# Patient Record
Sex: FEMALE | Race: ASIAN
[De-identification: names, ages, dates, MRNs, and addresses within clinical notes are randomized per-mention and may not be internally consistent; named-entity substitution may affect disease eponyms.]

---

## 2017-04-24 ENCOUNTER — HOSPITAL ENCOUNTER (OUTPATIENT)
Age: 72
Discharge: HOME | End: 2017-04-24
Payer: COMMERCIAL

## 2017-04-24 DIAGNOSIS — Z12.31: Primary | ICD-10-CM

## 2017-04-24 DIAGNOSIS — M85.88: Primary | ICD-10-CM

## 2018-06-19 ENCOUNTER — HOSPITAL ENCOUNTER (OUTPATIENT)
Dept: HOSPITAL 76 - DI | Age: 73
Discharge: HOME | End: 2018-06-19
Attending: INTERNAL MEDICINE
Payer: COMMERCIAL

## 2018-06-19 DIAGNOSIS — D25.9: Primary | ICD-10-CM

## 2018-06-19 PROCEDURE — 76856 US EXAM PELVIC COMPLETE: CPT

## 2018-06-19 PROCEDURE — 76830 TRANSVAGINAL US NON-OB: CPT

## 2018-06-20 NOTE — ULTRASOUND REPORT
Procedure Date:  06/19/2018   

Accession Number:  [id number] / [id number]                    

Procedure:  US  - Pelvic w/Transvaginal CPT Code:  

 

FULL RESULT:

 

 

EXAM: Pelvic w/Transvaginal

 

DATE: 6/19/2018 4:42 PM

 

CLINICAL HISTORY: PELVIC PAIN

 

COMPARISON: 12/29/2010 CT

 

TECHNIQUE: Real time scanning by the sonographer with saved static images 

reviewed. Transabdominal approach for global evaluation. Transvaginal 

scanning for detailed assessment of the uterus and ovaries.

 

FINDINGS:

 

Uterus:  6.6 x 3.3 x 4.8 cm, volume 54.6 cc. Retroverted position. Normal 

overall size and echotexture.

 

Masses: Fundal exophytic fibroid 2.2 x 2 x 3.5 cm similar to prior CT.

 

Endometrium: 2 mm mm. Small amount of fluid in the endometrial canal..

 

Cervix: Unremarkable.

 

Right Ovary/Adnexa: Ovary not definitely identified. No adnexal masses.

 

Left Ovary/Adnexa: 2.3 x 1.2 x 1.9 cm, volume 2.7 cc. Normal echotexture. 

Blood flow is present. No adnexal mass is seen.

 

Free Fluid: None.

 

Other: None.

 

IMPRESSION:

Exophytic fundal uterine fibroid. Small amount of fluid within the 

endometrial canal. Otherwise negative. RADIA

## 2019-07-17 ENCOUNTER — HOSPITAL ENCOUNTER (OUTPATIENT)
Dept: HOSPITAL 76 - DI | Age: 74
Discharge: HOME | End: 2019-07-17
Attending: INTERNAL MEDICINE
Payer: COMMERCIAL

## 2019-07-17 DIAGNOSIS — R07.89: Primary | ICD-10-CM

## 2019-07-17 DIAGNOSIS — R10.9: ICD-10-CM

## 2019-07-17 PROCEDURE — 74249: CPT

## 2019-07-17 PROCEDURE — 71046 X-RAY EXAM CHEST 2 VIEWS: CPT

## 2019-07-17 NOTE — XRAY REPORT
Reason:  UNSPECIFIED ABDOMINAL PAIN,OTHER CHEST PAIN

Procedure Date:  07/17/2019   

Accession Number:  [id number] / [id number]                    

Procedure:  FL  - UGI SBFT W/Air CPT Code:  

 

FULL RESULT:

 

 

EXAM:

UPPER GI WITH SMALL BOWEL FOLLOW-THROUGH

 

EXAM DATE: 7/17/2019 02:24 PM.

 

CLINICAL HISTORY: Unspecified abdominal pain, other chest pain.

 

COMPARISONS: UGI SBFT W/AIR 07/17/2019 11:16 AM.

 

TECHNIQUE: Routine single contrast upper gastrointestinal small bowel 

follow-through technique.

Fluoroscopy Time: 30 seconds.

Number of fluoroscopy images: 20.

 

FINDINGS:

 

Esophageal Motility: Normal peristaltic stripping wave.

 

Gastroesophageal Junction: Normal. No hernia or significant reflux 

demonstrated with or without Valsalva maneuvers.

 

Stomach: Within normal limits on single contrast technique.

 

Duodenum: Normal duodenal mucosal pattern. No ulcers or diverticula 

identified.

 

Jejunum: Normal mucosal pattern. No masses or obstruction.

 

Ileum: Normal mucosal pattern. No masses or obstruction. The ileocecal 

valve is patent with passage of contrast into the cecum.

 

Other: None.

IMPRESSION: Normal upper GI with small bowel follow-through.

 

RADIA

## 2019-07-17 NOTE — XRAY REPORT
Reason:  UNSPECIFIED ABDOMINAL PAIN, OTHER CHEST PAIN

Procedure Date:  07/17/2019   

Accession Number:  [id number] / [id number]                    

Procedure:  XR  - Chest 2 View X-Ray CPT Code:  48354

 

FULL RESULT:

 

 

EXAM:

CHEST RADIOGRAPHY

 

EXAM DATE: 7/17/2019 11:28 AM.

 

CLINICAL HISTORY: Unspecified abdominal pain, other chest pain.

 

COMPARISON: None.

 

TECHNIQUE: 2 views.

 

FINDINGS:

Lungs/Pleura: No focal opacities evident. No pleural effusion. No 

pneumothorax. Normal volumes.

 

Mediastinum: Heart and mediastinal contours are within normal limits for 

size, mild calcifications of the aortic arch.

 

Other: Cholecystectomy clips are noted.

IMPRESSION: No acute cardiopulmonary abnormality.

 

RADIA

## 2019-09-14 ENCOUNTER — HOSPITAL ENCOUNTER (EMERGENCY)
Dept: HOSPITAL 76 - ED | Age: 74
LOS: 1 days | Discharge: HOME | End: 2019-09-15
Payer: COMMERCIAL

## 2019-09-14 VITALS — DIASTOLIC BLOOD PRESSURE: 71 MMHG | SYSTOLIC BLOOD PRESSURE: 159 MMHG

## 2019-09-14 DIAGNOSIS — F45.8: ICD-10-CM

## 2019-09-14 DIAGNOSIS — F43.22: Primary | ICD-10-CM

## 2019-09-14 LAB
ALBUMIN DIAFP-MCNC: 4.2 G/DL (ref 3.2–5.5)
ALBUMIN/GLOB SERPL: 1.1 {RATIO} (ref 1–2.2)
ALP SERPL-CCNC: 73 IU/L (ref 42–121)
ALT SERPL W P-5'-P-CCNC: 18 IU/L (ref 10–60)
ANION GAP SERPL CALCULATED.4IONS-SCNC: 14 MMOL/L (ref 6–13)
AST SERPL W P-5'-P-CCNC: 21 IU/L (ref 10–42)
BASOPHILS NFR BLD AUTO: 0.1 10^3/UL (ref 0–0.1)
BASOPHILS NFR BLD AUTO: 0.6 %
BILIRUB BLD-MCNC: 0.5 MG/DL (ref 0.2–1)
BUN SERPL-MCNC: 25 MG/DL (ref 6–20)
CALCIUM UR-MCNC: 9.4 MG/DL (ref 8.5–10.3)
CHLORIDE SERPL-SCNC: 103 MMOL/L (ref 101–111)
CLARITY UR REFRACT.AUTO: CLEAR
CO2 SERPL-SCNC: 23 MMOL/L (ref 21–32)
CREAT SERPLBLD-SCNC: 0.8 MG/DL (ref 0.4–1)
EOSINOPHIL # BLD AUTO: 0.2 10^3/UL (ref 0–0.7)
EOSINOPHIL NFR BLD AUTO: 1.6 %
ERYTHROCYTE [DISTWIDTH] IN BLOOD BY AUTOMATED COUNT: 11.9 % (ref 12–15)
GFRSERPLBLD MDRD-ARVRAT: 71 ML/MIN/{1.73_M2} (ref 89–?)
GLOBULIN SER-MCNC: 4 G/DL (ref 2.1–4.2)
GLUCOSE SERPL-MCNC: 155 MG/DL (ref 70–100)
GLUCOSE UR QL STRIP.AUTO: NEGATIVE MG/DL
HGB UR QL STRIP: 13.8 G/DL (ref 12–16)
KETONES UR QL STRIP.AUTO: NEGATIVE MG/DL
LIPASE SERPL-CCNC: 45 U/L (ref 22–51)
LYMPHOCYTES # SPEC AUTO: 4.6 10^3/UL (ref 1.5–3.5)
LYMPHOCYTES NFR BLD AUTO: 48.8 %
MCH RBC QN AUTO: 30.7 PG (ref 27–31)
MCHC RBC AUTO-ENTMCNC: 33.7 G/DL (ref 32–36)
MCV RBC AUTO: 91.1 FL (ref 81–99)
MONOCYTES # BLD AUTO: 0.7 10^3/UL (ref 0–1)
MONOCYTES NFR BLD AUTO: 7.6 %
NEUTROPHILS # BLD AUTO: 3.8 10^3/UL (ref 1.5–6.6)
NEUTROPHILS # SNV AUTO: 9.3 X10^3/UL (ref 4.8–10.8)
NEUTROPHILS NFR BLD AUTO: 41.2 %
NITRITE UR QL STRIP.AUTO: NEGATIVE
PDW BLD AUTO: 9.6 FL (ref 7.9–10.8)
PH UR STRIP.AUTO: 7 PH (ref 5–7.5)
PLATELET # BLD: 349 10^3/UL (ref 130–450)
PROT SPEC-MCNC: 8.2 G/DL (ref 6.7–8.2)
PROT UR STRIP.AUTO-MCNC: NEGATIVE MG/DL
RBC # UR STRIP.AUTO: (no result) /UL
RBC # URNS HPF: (no result) /HPF (ref 0–5)
RBC MAR: 4.49 10^6/UL (ref 4.2–5.4)
SODIUM SERPLBLD-SCNC: 140 MMOL/L (ref 135–145)
SP GR UR STRIP.AUTO: 1.01 (ref 1–1.03)
SQUAMOUS URNS QL MICRO: (no result)
UROBILINOGEN UR QL STRIP.AUTO: (no result) E.U./DL
UROBILINOGEN UR STRIP.AUTO-MCNC: NEGATIVE MG/DL

## 2019-09-14 PROCEDURE — 83690 ASSAY OF LIPASE: CPT

## 2019-09-14 PROCEDURE — 93005 ELECTROCARDIOGRAM TRACING: CPT

## 2019-09-14 PROCEDURE — 99284 EMERGENCY DEPT VISIT MOD MDM: CPT

## 2019-09-14 PROCEDURE — 96374 THER/PROPH/DIAG INJ IV PUSH: CPT

## 2019-09-14 PROCEDURE — 36415 COLL VENOUS BLD VENIPUNCTURE: CPT

## 2019-09-14 PROCEDURE — 84484 ASSAY OF TROPONIN QUANT: CPT

## 2019-09-14 PROCEDURE — 81001 URINALYSIS AUTO W/SCOPE: CPT

## 2019-09-14 PROCEDURE — 81003 URINALYSIS AUTO W/O SCOPE: CPT

## 2019-09-14 PROCEDURE — 87086 URINE CULTURE/COLONY COUNT: CPT

## 2019-09-14 PROCEDURE — 85025 COMPLETE CBC W/AUTO DIFF WBC: CPT

## 2019-09-14 PROCEDURE — 80053 COMPREHEN METABOLIC PANEL: CPT

## 2019-09-14 NOTE — ED PHYSICIAN DOCUMENTATION
PD HPI DYSPNEA





- Stated complaint


Stated Complaint: SOA





- Chief complaint


Chief Complaint: Resp





- History obtained from


History obtained from: Patient





- History of Present Illness


Timing - onset: Today


Timing - onset during: Rest


Timing - duration: Hours


Timing - details: Gradual onset, Still present


Inciting event(s): Emotional event


Improved by: Rest


Worsened by: Other (thinking of friend that just .)


Associated symptoms: No: Fever, Cough, Hemoptysis, Wheezing


Similar symptoms before: Diagnosis (anxiety)


Recently seen: Clinic





- Additional information


Additional information: 





73 y/o female with a history of grief reaction causing panic attack has had a 

friend die and she is emotionally upset. She has hyperventilation syndrome and 

has had medication to help this previously. She recalls a number of times this 

has happened related to deaths. She states that she is not currently otherwise 

ill. 





Review of Systems


Constitutional: denies: Fever


Eyes: denies: Decreased vision


Ears: denies: Ear pain


Nose: denies: Rhinorrhea / runny nose, Congestion


Throat: denies: Sore throat


Cardiac: denies: Chest pain / pressure, Palpitations


Respiratory: reports: Dyspnea.  denies: Cough, Wheezing


GI: denies: Abdominal Pain, Nausea, Vomiting


: denies: Dysuria, Frequency


Musculoskeletal: denies: Neck pain


Neurologic: denies: Generalized weakness, Focal weakness, Numbness


Psychiatric: reports: Depressed, Anxiety





PD PAST MEDICAL HISTORY





- Past Medical History


Cardiovascular: None


Respiratory: None


Endocrine/Autoimmune: None


GI: Hiatal hernia


Musculoskeletal: Osteoporosis





- Past Surgical History


Past Surgical History: Yes


General: Cholecystectomy


/GYN:  section





- Present Medications


Home Medications: 


                                Ambulatory Orders











 Medication  Instructions  Recorded  Confirmed


 


Hydrocodone/Acetaminophen [Norco 1 each PO Q6H PRN #20 tablet 16 





5-325 Tablet]   


 


Lorazepam [Ativan] 1 mg PO Q6HR PRN #12 tablet 19 














- Allergies


Allergies/Adverse Reactions: 


                                    Allergies











Allergy/AdvReac Type Severity Reaction Status Date / Time


 


Penicillins Allergy  Unknown Verified 19 22:24














- Social History


Does the pt smoke?: No


Smoking Status: Never smoker


Does the pt drink ETOH?: No


Does the pt have substance abuse?: No





- Immunizations


Immunizations are current?: Yes





PD ED PE NORMAL





- Vitals


Vital signs reviewed: Yes (hypertensive and tachypneic)





- General


General: Alert and oriented X 3, Well developed/nourished, Other (73 y/o female 

anxious and crying )





- HEENT


HEENT: Atraumatic, PERRL, EOMI





- Neck


Neck: Supple, no meningeal sign, No bony TTP





- Cardiac


Cardiac: RRR, No murmur





- Respiratory


Respiratory: No respiratory distress, Clear bilaterally





- Abdomen


Abdomen: Soft, Non tender





- Back


Back: No CVA TTP, No spinal TTP





- Derm


Derm: Normal color, Warm and dry, No rash





- Extremities


Extremities: No deformity, No tenderness to palpate, Normal ROM s pain, No 

edema, No calf tenderness / cord





- Neuro


Neuro: Alert and oriented X 3, CNs 2-12 intact, No motor deficit, No sensory 

deficit, Normal speech


Eye Opening: Spontaneous


Motor: Obeys Commands


Verbal: Oriented


GCS Score: 15





- Psych


Psych: Other (mood is anxious and the affect is labile from flat to crying )





Results





- Vitals


Vitals: 


                               Vital Signs - 24 hr











  19





  22:15 23:36 23:53


 


Temperature 37.1 C  


 


Heart Rate 77 72 73


 


Respiratory 26 H 20 21





Rate   


 


Blood Pressure 208/89 H  159/71 H


 


O2 Saturation 100 96 96








                                     Oxygen











O2 Source                      Room air

















- EKG (time done)


  ** 2222


Rate: Rate (enter#) (67)


Rhythm: NSR


Ischemia: Normal ST segments


Compare to prior EKG: Old EKG unavailable


Computer interpretation: Agree with computer





- Labs


Labs: 


                                Laboratory Tests











  19





  22:18 22:25 22:25


 


WBC   9.3 


 


RBC   4.49 


 


Hgb   13.8 


 


Hct   40.9 


 


MCV   91.1 


 


MCH   30.7 


 


MCHC   33.7 


 


RDW   11.9 L 


 


Plt Count   349 


 


MPV   9.6 


 


Neut # (Auto)   3.8 


 


Lymph # (Auto)   4.6 H 


 


Mono # (Auto)   0.7 


 


Eos # (Auto)   0.2 


 


Baso # (Auto)   0.1 


 


Absolute Nucleated RBC   0.00 


 


Nucleated RBC %   0.0 


 


Sodium    140


 


Potassium    3.2 L


 


Chloride    103


 


Carbon Dioxide    23


 


Anion Gap    14.0 H


 


BUN    25 H


 


Creatinine    0.8


 


Estimated GFR (MDRD)    71 L


 


Glucose    155 H


 


Calcium    9.4


 


Total Bilirubin    0.5


 


AST    21


 


ALT    18


 


Alkaline Phosphatase    73


 


Troponin I High Sens   


 


Total Protein    8.2


 


Albumin    4.2


 


Globulin    4.0


 


Albumin/Globulin Ratio    1.1


 


Lipase    45


 


Urine Color  COLORLESS  


 


Urine Clarity  CLEAR  


 


Urine pH  7.0  


 


Ur Specific Gravity  1.010  


 


Urine Protein  NEGATIVE  


 


Urine Glucose (UA)  NEGATIVE  


 


Urine Ketones  NEGATIVE  


 


Urine Occult Blood  SMALL H  


 


Urine Nitrite  NEGATIVE  


 


Urine Bilirubin  NEGATIVE  


 


Urine Urobilinogen  0.2 (NORMAL)  


 


Ur Leukocyte Esterase  NEGATIVE  


 


Urine RBC  0-5  


 


Urine WBC  0-3  


 


Ur Squamous Epith Cells  RARE Squamous  


 


Urine Bacteria  None Seen  


 


Ur Microscopic Review  INDICATED  


 


Urine Culture Comments  NOT INDICATED  














  19





  22:25


 


WBC 


 


RBC 


 


Hgb 


 


Hct 


 


MCV 


 


MCH 


 


MCHC 


 


RDW 


 


Plt Count 


 


MPV 


 


Neut # (Auto) 


 


Lymph # (Auto) 


 


Mono # (Auto) 


 


Eos # (Auto) 


 


Baso # (Auto) 


 


Absolute Nucleated RBC 


 


Nucleated RBC % 


 


Sodium 


 


Potassium 


 


Chloride 


 


Carbon Dioxide 


 


Anion Gap 


 


BUN 


 


Creatinine 


 


Estimated GFR (MDRD) 


 


Glucose 


 


Calcium 


 


Total Bilirubin 


 


AST 


 


ALT 


 


Alkaline Phosphatase 


 


Troponin I High Sens  3.3


 


Total Protein 


 


Albumin 


 


Globulin 


 


Albumin/Globulin Ratio 


 


Lipase 


 


Urine Color 


 


Urine Clarity 


 


Urine pH 


 


Ur Specific Gravity 


 


Urine Protein 


 


Urine Glucose (UA) 


 


Urine Ketones 


 


Urine Occult Blood 


 


Urine Nitrite 


 


Urine Bilirubin 


 


Urine Urobilinogen 


 


Ur Leukocyte Esterase 


 


Urine RBC 


 


Urine WBC 


 


Ur Squamous Epith Cells 


 


Urine Bacteria 


 


Ur Microscopic Review 


 


Urine Culture Comments 














PD MEDICAL DECISION MAKING





- ED course


Complexity details: reviewed old records, reviewed results, re-evaluated 

patient, considered differential, d/w patient, d/w family


ED course: 





73 y/o female with a history of grief reaction causing hyperventilation has 

hyperventilation with grief reaction and she is administered IV ativan with 

marked improvement and thankfulness. 





Departure





- Departure


Disposition: 01 Home, Self Care


Clinical Impression: 


 Grief reaction





Condition: Stable


Instructions:  ED Grief Reaction


Follow-Up: 


Amirah Bruno MD [Primary Care Provider] - 


Prescriptions: 


Lorazepam [Ativan] 1 mg PO Q6HR PRN #12 tablet


 PRN Reason: Anxiety


Discharge Date/Time: 09/15/19 00:05

## 2019-09-26 ENCOUNTER — HOSPITAL ENCOUNTER (EMERGENCY)
Dept: HOSPITAL 76 - ED | Age: 74
LOS: 1 days | Discharge: HOME | End: 2019-09-27
Payer: COMMERCIAL

## 2019-09-26 DIAGNOSIS — F41.9: Primary | ICD-10-CM

## 2019-09-26 PROCEDURE — 99283 EMERGENCY DEPT VISIT LOW MDM: CPT

## 2019-09-26 NOTE — ED PHYSICIAN DOCUMENTATION
History of Present Illness





- Stated complaint


Stated Complaint: EARS BURNING/MEDICATION CONCERN





- Chief complaint


Chief Complaint: General





- Additonal information


Additional information: 





This is a 74-year-old female who presents with anxiety.  Patient states that she

developed anxiety attacks in the past after a friend of hers , these been 

well controlled until recently when she developed panic attack.  She was seen 

here in the emergency department and was prescribed some Ativan.  She took this 

a few times and it seemed to help.  She recently followed up with her primary 

care provider who told her to stop taking Ativan given the possible side effects

it has for someone her age, and started her on buspirone instead.  She is 

feeling well over the last several days but then tonight she began developing a 

feeling of anxiety and worry, and she felt that her ears were burning and she 

felt quite panicked.  She took a buspirone tablet but this did not relieve her 

symptoms so she presented here for further treatment of her anxiety.  She denies

any chest pain or shortness of breath, No fever or abdominal pain.





Review of Systems


Constitutional: denies: Fever


Cardiac: denies: Chest pain / pressure


GI: denies: Abdominal Pain


Psychiatric: reports: Anxiety





PD PAST MEDICAL HISTORY





- Past Medical History


Past Medical History: Yes


Cardiovascular: None


Respiratory: None


Neuro: None


Endocrine/Autoimmune: None


GI: Hiatal hernia


GYN: None


: None


HEENT: None


Psych: Anxiety


Musculoskeletal: Osteoporosis


Derm: None





- Past Surgical History


Past Surgical History: Yes


General: Cholecystectomy


/GYN:  section





- Present Medications


Home Medications: 


                                Ambulatory Orders











 Medication  Instructions  Recorded  Confirmed


 


Hydrocodone/Acetaminophen [Norco 1 each PO Q6H PRN #20 tablet 16 





5-325 Tablet]   


 


Lorazepam [Ativan] 1 mg PO Q6HR PRN #12 tablet 19 


 


hydrOXYzine HCl [Hydroxyzine HCl] 10 mg PO DAILY PRN #5 tablet 19 














- Allergies


Allergies/Adverse Reactions: 


                                    Allergies











Allergy/AdvReac Type Severity Reaction Status Date / Time


 


Penicillins Allergy  Unknown Verified 19 23:12














- Social History


Does the pt smoke?: No


Smoking Status: Never smoker


Does the pt drink ETOH?: No


Does the pt have substance abuse?: No





- Immunizations


Immunizations are current?: Yes





- POLST


Patient has POLST: No





PD ED PE NORMAL





- Vitals


Vital signs reviewed: Yes





- General


General: Alert and oriented X 3, No acute distress





- HEENT


HEENT: PERRL





- Neck


Neck: Supple, no meningeal sign





- Cardiac


Cardiac: RRR





- Respiratory


Respiratory: Clear bilaterally





- Abdomen


Abdomen: Normal bowel sounds, Soft, Non tender, Non distended





- Derm


Derm: Warm and dry





- Extremities


Extremities: No deformity





- Neuro


Neuro: Alert and oriented X 3





- Psych


Psych: Normal mood, Normal affect





Results





- Vitals


Vitals: 


                                     Oxygen











O2 Source                      Room air

















PD MEDICAL DECISION MAKING





- ED course


Complexity details: considered differential (Anxiety, medication side effect)


ED course: 


On initial evaluation patient appears well, she is somewhat anxious.  Her vital 

signs are unremarkable, her exam is also unremarkable.  She is presenting today 

because her anxiety flared up and the buspirone did not help.  I discussed with 

her that the buspirone is not a short acting medication and that she needs to 

take it daily for it to work.  I did give her 1 dose of Ativan here with 

significant relief of her symptoms.  I agree with her primary care provider that

benzodiazepines are not ideal for control the patient's symptoms outside of the 

hospital.  I do feel comfortable prescribing her a few tablets of hydroxyzine to

be used over the next day or 2 if she has symptoms while we are waiting for the 

buspirone to take effect.  I discussed that the hydroxyzine also can have side 

effects, particularly in patients of her age, it can be sedating and cause some 

urinary retention or confusion.  Patient states that she will take this 

sparingly, and only if needed.  She understands the risks.  I recommended to 

continue to follow closely with her primary care provider, and that she return 

to emergency department if she has new or worsening symptoms.  Patient agrees 

with this plan, is feeling well, and her anxiety has subsided.  She was 

discharged home in the care of her .





Departure





- Departure


Disposition:  Home, Self Care


Clinical Impression: 


 Anxiety





Condition: Good


Instructions:  Buspirone tablets, ED Panic Attack


Follow-Up: 


Amirah Bruno MD [Primary Care Provider] -  (Call to discuss your medications

and to schedule follow up)


Prescriptions: 


hydrOXYzine HCl [Hydroxyzine HCl] 10 mg PO DAILY PRN #5 tablet


 PRN Reason: Anxiety


Comments: 


You were seen today for anxiety.  It takes time for the buspirone medication to 

work, so please take this every day as directed by your primary care provider.  

If your anxiety is not managed by the buspirone, you may try the hydroxyzine for

panic attacks, but you should not use this regularly as it can have side 

effects.  It can also make you sleepy. Please also consider counseling, 

mindfulness, or meditation, as these can help with anxiety as well. If you are 

having worsening symptoms please return to the emergency department.


Discharge Date/Time: 19 00:51

## 2019-09-27 VITALS — DIASTOLIC BLOOD PRESSURE: 69 MMHG | SYSTOLIC BLOOD PRESSURE: 142 MMHG

## 2021-02-24 ENCOUNTER — HOSPITAL ENCOUNTER (OUTPATIENT)
Dept: HOSPITAL 76 - DI | Age: 76
Discharge: HOME | End: 2021-02-24
Attending: INTERNAL MEDICINE
Payer: COMMERCIAL

## 2021-02-24 DIAGNOSIS — M79.602: Primary | ICD-10-CM

## 2021-02-24 NOTE — XRAY REPORT
PROCEDURE:  Humerus LT

 

INDICATIONS:  SEVERE L ARM PAIN

 

TECHNIQUE:  2 views of the humerus were acquired.  

 

COMPARISON:  None

 

FINDINGS:  

 

Bones:  No fractures or dislocations. There is a sclerotic focus within the left humeral head measuri
ng roughly 20 mm.

 

Soft tissues:  No suspicious soft tissue calcifications.  

 

IMPRESSION:  

Bony infarct versus enchondroma involving the left humeral head. Further assessment with MRI with and
 without intravenous contrast is recommended.

 

Reviewed by: James Olvera MD on 2/24/2021 11:17 AM PST

Approved by: James Olvera MD on 2/24/2021 11:17 AM PST

 

 

Station ID:  [id number]

## 2021-06-04 ENCOUNTER — HOSPITAL ENCOUNTER (OUTPATIENT)
Dept: HOSPITAL 76 - DI.N | Age: 76
Discharge: HOME | End: 2021-06-04
Attending: FAMILY MEDICINE
Payer: COMMERCIAL

## 2021-06-04 DIAGNOSIS — R93.7: ICD-10-CM

## 2021-06-04 DIAGNOSIS — M19.011: ICD-10-CM

## 2021-06-04 DIAGNOSIS — M51.34: Primary | ICD-10-CM

## 2021-06-04 NOTE — XRAY REPORT
PROCEDURE:  Thoracic Spine 3 View

 

INDICATIONS:  THORACIC BACK PX

 

TECHNIQUE:  3 views of the thoracic spine were acquired.  

 

COMPARISON:  None.

 

FINDINGS:  

 

Bones:  No fractures or dislocations.  No suspicious bony lesions.  12 pairs of ribs are noted, and a
ppear intact where visualized. Moderate degenerative disc changes noted throughout the thoracic spine
. 2.3 x 1.1 cm nodular density noted adjacent to the left margin of the T7 and T8 vertebral bodies wh
ich may represent lateral osteophyte, however neoplastic process cannot be excluded.

 

Soft tissues:  No paravertebral stripe thickening. Cholecystectomy clips. 

 

 

IMPRESSION:  

 

1. Multilevel degenerative disease.

 

2. Nodular density adjacent to the left margin of T7 and T8 vertebral bodies which could represent la
teral osteophyte or neoplastic process. Recommend MRI of the thoracic spine with and without contrast
 for additional evaluation.

 

Reviewed by: Esther Hernandez MD, PhD on 6/4/2021 4:47 PM PDT

Approved by: Esther Hernandez MD, PhD on 6/4/2021 4:47 PM PDT

 

 

Station ID:  [id number]

## 2021-06-04 NOTE — XRAY REPORT
PROCEDURE:  Shoulder 3 View RT

 

INDICATIONS:  R SHOULDER PX

 

TECHNIQUE:  3 views of the shoulder were acquired.  

 

COMPARISON:  None.

 

FINDINGS:  

 

Bones:  No fractures or dislocations.  No suspicious bony lesions.  Visualized ribs appear intact.  

 

Soft tissues:  No suspicious soft tissue calcifications.  

 

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 No trauma seen.

 

Reviewed by: Darnell Bowman MD on 6/4/2021 11:58 AM OSWALD

Approved by: Darnell Bowman MD on 6/4/2021 11:58 AM OS[facility]

 

 

Station ID:  [id number]

## 2021-09-27 ENCOUNTER — HOSPITAL ENCOUNTER (OUTPATIENT)
Dept: HOSPITAL 76 - LAB | Age: 76
Discharge: HOME | End: 2021-09-27
Attending: INTERNAL MEDICINE
Payer: COMMERCIAL

## 2021-09-27 DIAGNOSIS — Z79.899: ICD-10-CM

## 2021-09-27 DIAGNOSIS — R93.7: Primary | ICD-10-CM

## 2021-09-27 DIAGNOSIS — M19.012: ICD-10-CM

## 2021-09-27 DIAGNOSIS — R93.6: ICD-10-CM

## 2021-09-27 LAB
CREAT SERPLBLD-SCNC: 0.6 MG/DL (ref 0.4–1)
GFRSERPLBLD MDRD-ARVRAT: 98 ML/MIN/{1.73_M2} (ref 89–?)

## 2021-09-27 PROCEDURE — 82565 ASSAY OF CREATININE: CPT

## 2021-09-27 PROCEDURE — 36415 COLL VENOUS BLD VENIPUNCTURE: CPT

## 2021-09-27 PROCEDURE — 73223 MRI JOINT UPR EXTR W/O&W/DYE: CPT

## 2021-09-27 PROCEDURE — 72157 MRI CHEST SPINE W/O & W/DYE: CPT

## 2021-09-27 NOTE — MRI REPORT
PROCEDURE:  Shoulder LT W/WO

 

INDICATIONS:  ABN XRAY

 

CONTRAST:  IV CONTRAST: Gadavist ml: 5.5  

 

TECHNIQUE:  

Noncontrast oblique coronal T1 spin echo and T2 fast spin echo with fat saturation, oblique sagittal 
T1 spin echo and T2 fast spin echo with fat saturation, axial T1 spin echo and T2 fast spin echo with
 fat saturation through the shoulder.  Post-contrast oblique coronal, oblique sagittal, and axial T1 
spin echo with fat saturation through the shoulder.  

 

COMPARISON:  Left humerus radiograph dated 2/24/2021

 

FINDINGS:  

Image quality:  Excellent.  

 

Bones and bursae:  No fracture identified. There is a intramedullary lesion demonstrating stippled T2
 hyperintense appearance, and punctate calcifications measuring approximately 1.8 x 1.7 cm on axial i
mage 15/1601. No adjacent marrow edema. No overlying periosteal reaction. No cortical disruption. Thi
s is most likely related to low-grade chondroid lesion, statistically enchondroma. This would be comp
atible with the radiographic appearance on the prior study. Severe glenohumeral joint degeneration, w
ith subchondral cystic change and marrow edema. Scattered subchondral sclerosis and spurring.  

 

Capsule and soft tissues:  No suspicious soft tissue enhancement.  Supraspinatus and infraspinatus te
ndinopathy and thickening, there is probable partial thickness articular and bursal sided tear althou
gh not well visualized given exam protocol. Prominent fluid seen within the subcoracoid bursa, or pos
sibly the subscapularis recess. Circumferential labral degeneration and fraying.

 

IMPRESSION:  

 

Proximal left humeral intramedullary lesion, likely reflects low-grade chondroid lesion, statisticall
y an enchondroma. Recommend continued surveillance with serial 6 month interval radiographs to docume
nt long-term stability for at least 2 years given the absence of more remote prior studies.

 

Severe glenohumeral joint degeneration.

 

 

Reviewed by: Chavez Hobbs MD on 9/27/2021 5:02 PM PDT

Approved by: Chavez Hobbs MD on 9/27/2021 5:02 PM PDT

 

 

Station ID:  [id number]

## 2021-09-27 NOTE — MRI REPORT
PROCEDURE:  Thoracic Spine W/WO

 

INDICATIONS:  T7   T8 ABNORMALITY

 

CONTRAST:  IV CONTRAST: Gadavist ml: 5.5  

 

TECHNIQUE:  

Noncontrast sagittal T1 spin echo and T2 fast spin echo, sagittal STIR, axial T1 and T2 fast spin ech
o through the thoracic spine.  After the administration of contrast, axial and sagittal T1 spin echo 
with fat saturation through the thoracic spine.  

 

COMPARISON:  None.

 

FINDINGS:  

Image quality:  Excellent.  

 

Alignment and curvature:  There is normal bony alignment.  

 

Marrow: Rounded 1.2 cm hypointensity in the T8 and T12 vertebral bodies do show mild enhancement and 
are low signal on T1 and T2 sequencing. Vertebral body height and alignment is otherwise maintained.

 

Spinal cord:  Visualized spinal cord is of normal signal and size, without abnormal enhancement.  

 

Paraspinous soft tissues:  No paravertebral masses or abnormal enhancement.  Small bilateral simple r
enal simple cysts measuring less 1 cm

 

Miscellaneous:  Central canal and foramina appear widely patent at all scanned levels.  There is mild
 hypertrophy of the left costovertebral joint at the T7 and T8 corresponding with the density on the 
prior radiograph

 

IMPRESSION:  

 

1. Left T7 and T8 density in the prior radiograph corresponds with hypertrophic costovertebral joint.


2. Probable lipid poor atypical hemangioma in the T8 and T12 vertebral bodies show mild enhancement. 
Consider follow-up bone scan to exclude less likely osseous metastatic disease

 

Reviewed by: Salvador Ayoub MD on 9/27/2021 4:20 PM [facility]

Approved by: Salvador Ayoub MD on 9/27/2021 4:20 PM AKDAVE

 

 

Station ID:  [id number]

## 2023-08-28 ENCOUNTER — HOSPITAL ENCOUNTER (OUTPATIENT)
Dept: HOSPITAL 76 - LAB.N | Age: 78
Discharge: HOME | End: 2023-08-28
Attending: NURSE PRACTITIONER
Payer: COMMERCIAL

## 2023-08-28 DIAGNOSIS — I70.90: ICD-10-CM

## 2023-08-28 DIAGNOSIS — I10: Primary | ICD-10-CM

## 2023-08-28 LAB
ALBUMIN DIAFP-MCNC: 4.6 G/DL (ref 3.2–5.5)
ALBUMIN/GLOB SERPL: 1.4 {RATIO} (ref 1–2.2)
ALP SERPL-CCNC: 85 IU/L (ref 42–121)
ALT SERPL W P-5'-P-CCNC: 13 IU/L (ref 10–60)
ANION GAP SERPL CALCULATED.4IONS-SCNC: 7 MMOL/L (ref 6–13)
AST SERPL W P-5'-P-CCNC: 19 IU/L (ref 10–42)
BASOPHILS NFR BLD AUTO: 0 10^3/UL (ref 0–0.1)
BASOPHILS NFR BLD AUTO: 0.8 %
BILIRUB BLD-MCNC: 1 MG/DL (ref 0.2–1)
BUN SERPL-MCNC: 17 MG/DL (ref 6–20)
CALCIUM UR-MCNC: 10.1 MG/DL (ref 8.5–10.3)
CHLORIDE SERPL-SCNC: 101 MMOL/L (ref 101–111)
CHOLEST SERPL-MCNC: 230 MG/DL
CO2 SERPL-SCNC: 29 MMOL/L (ref 21–32)
CREAT SERPLBLD-SCNC: 0.6 MG/DL (ref 0.6–1.3)
EOSINOPHIL # BLD AUTO: 0 10^3/UL (ref 0–0.7)
EOSINOPHIL NFR BLD AUTO: 0.8 %
ERYTHROCYTE [DISTWIDTH] IN BLOOD BY AUTOMATED COUNT: 11.9 % (ref 12–15)
GFRSERPLBLD MDRD-ARVRAT: 97 ML/MIN/{1.73_M2} (ref 89–?)
GLOBULIN SER-MCNC: 3.3 G/DL (ref 2.1–4.2)
GLUCOSE SERPL-MCNC: 109 MG/DL (ref 74–104)
HCT VFR BLD AUTO: 43.5 % (ref 37–47)
HDLC SERPL-MCNC: 63 MG/DL
HDLC SERPL: 3.7 {RATIO} (ref ?–4.4)
HGB UR QL STRIP: 14.2 G/DL (ref 12–16)
LDLC SERPL CALC-MCNC: 150 MG/DL
LDLC/HDLC SERPL: 2.4 {RATIO} (ref ?–4.4)
LYMPHOCYTES # SPEC AUTO: 2.3 10^3/UL (ref 1.5–3.5)
LYMPHOCYTES NFR BLD AUTO: 43.3 %
MCH RBC QN AUTO: 30.9 PG (ref 27–31)
MCHC RBC AUTO-ENTMCNC: 32.6 G/DL (ref 32–36)
MCV RBC AUTO: 94.6 FL (ref 81–99)
MONOCYTES # BLD AUTO: 0.4 10^3/UL (ref 0–1)
MONOCYTES NFR BLD AUTO: 7.6 %
NEUTROPHILS # BLD AUTO: 2.5 10^3/UL (ref 1.5–6.6)
NEUTROPHILS # SNV AUTO: 5.3 X10^3/UL (ref 4.8–10.8)
NEUTROPHILS NFR BLD AUTO: 47.5 %
NRBC # BLD AUTO: 0 /100WBC
NRBC # BLD AUTO: 0 X10^3/UL
PDW BLD AUTO: 10.5 FL (ref 7.9–10.8)
PLATELET # BLD: 327 10^3/UL (ref 130–450)
POTASSIUM SERPL-SCNC: 3.8 MMOL/L (ref 3.5–4.5)
PROT SPEC-MCNC: 7.9 G/DL (ref 6.4–8.9)
RBC MAR: 4.6 10^6/UL (ref 4.2–5.4)
SODIUM SERPLBLD-SCNC: 137 MMOL/L (ref 135–145)
TRIGL P FAST SERPL-MCNC: 86 MG/DL (ref 48–352)
TSH SERPL-ACNC: 1.84 UIU/ML (ref 0.34–5.6)
VLDLC SERPL-SCNC: 17 MG/DL

## 2023-08-28 PROCEDURE — 80061 LIPID PANEL: CPT

## 2023-08-28 PROCEDURE — 84443 ASSAY THYROID STIM HORMONE: CPT

## 2023-08-28 PROCEDURE — 80053 COMPREHEN METABOLIC PANEL: CPT

## 2023-08-28 PROCEDURE — 36415 COLL VENOUS BLD VENIPUNCTURE: CPT

## 2023-08-28 PROCEDURE — 83721 ASSAY OF BLOOD LIPOPROTEIN: CPT

## 2023-08-28 PROCEDURE — 85025 COMPLETE CBC W/AUTO DIFF WBC: CPT

## 2024-09-19 ENCOUNTER — HOSPITAL ENCOUNTER (OUTPATIENT)
Dept: HOSPITAL 76 - LAB.N | Age: 79
Discharge: HOME | End: 2024-09-19
Attending: NURSE PRACTITIONER
Payer: COMMERCIAL

## 2024-09-19 DIAGNOSIS — I10: ICD-10-CM

## 2024-09-19 DIAGNOSIS — Z13.220: ICD-10-CM

## 2024-09-19 DIAGNOSIS — Z02.89: Primary | ICD-10-CM

## 2024-09-19 LAB
ALBUMIN DIAFP-MCNC: 4.5 G/DL (ref 3.2–5.5)
ALBUMIN/GLOB SERPL: 1.3 {RATIO} (ref 1–2.2)
ALP SERPL-CCNC: 65 IU/L (ref 42–121)
ALT SERPL W P-5'-P-CCNC: 12 IU/L (ref 10–60)
ANION GAP SERPL CALCULATED.4IONS-SCNC: 8 MMOL/L (ref 6–13)
AST SERPL W P-5'-P-CCNC: 19 IU/L (ref 10–42)
BASOPHILS NFR BLD AUTO: 0.1 10^3/UL (ref 0–0.1)
BASOPHILS NFR BLD AUTO: 0.8 %
BILIRUB BLD-MCNC: 0.8 MG/DL (ref 0.2–1)
BUN SERPL-MCNC: 14 MG/DL (ref 6–20)
CALCIUM UR-MCNC: 10.1 MG/DL (ref 8.5–10.3)
CHLORIDE SERPL-SCNC: 101 MMOL/L (ref 101–111)
CHOLEST SERPL-MCNC: 242 MG/DL
CO2 SERPL-SCNC: 29 MMOL/L (ref 21–32)
CREAT SERPLBLD-SCNC: 0.6 MG/DL (ref 0.6–1.3)
EOSINOPHIL # BLD AUTO: 0 10^3/UL (ref 0–0.7)
EOSINOPHIL NFR BLD AUTO: 0.3 %
ERYTHROCYTE [DISTWIDTH] IN BLOOD BY AUTOMATED COUNT: 12 % (ref 12–15)
GFRSERPLBLD MDRD-ARVRAT: 97 ML/MIN/{1.73_M2} (ref 89–?)
GLOBULIN SER-MCNC: 3.5 G/DL (ref 2.1–4.2)
GLUCOSE SERPL-MCNC: 104 MG/DL (ref 74–104)
HCT VFR BLD AUTO: 43.8 % (ref 37–47)
HDLC SERPL-MCNC: 59 MG/DL
HDLC SERPL: 4.1 {RATIO} (ref ?–4.4)
HGB UR QL STRIP: 14.2 G/DL (ref 12–16)
LDLC SERPL CALC-MCNC: 158 MG/DL
LDLC/HDLC SERPL: 2.7 {RATIO} (ref ?–4.4)
LYMPHOCYTES # SPEC AUTO: 2.1 10^3/UL (ref 1.5–3.5)
LYMPHOCYTES NFR BLD AUTO: 34.6 %
MCH RBC QN AUTO: 30.4 PG (ref 27–31)
MCHC RBC AUTO-ENTMCNC: 32.4 G/DL (ref 32–36)
MCV RBC AUTO: 93.8 FL (ref 81–99)
MONOCYTES # BLD AUTO: 0.5 10^3/UL (ref 0–1)
MONOCYTES NFR BLD AUTO: 7.8 %
NEUTROPHILS # BLD AUTO: 3.5 10^3/UL (ref 1.5–6.6)
NEUTROPHILS # SNV AUTO: 6.2 X10^3/UL (ref 4.8–10.8)
NEUTROPHILS NFR BLD AUTO: 56.3 %
NRBC # BLD AUTO: 0 /100WBC
NRBC # BLD AUTO: 0 X10^3/UL
PDW BLD AUTO: 10.3 FL (ref 7.9–10.8)
PLATELET # BLD: 350 10^3/UL (ref 130–450)
POTASSIUM SERPL-SCNC: 3.6 MMOL/L (ref 3.5–4.5)
PROT SPEC-MCNC: 8 G/DL (ref 6.4–8.9)
RBC MAR: 4.67 10^6/UL (ref 4.2–5.4)
SODIUM SERPLBLD-SCNC: 138 MMOL/L (ref 135–145)
TRIGL P FAST SERPL-MCNC: 126 MG/DL
TSH SERPL-ACNC: 2 UIU/ML (ref 0.34–5.6)
VLDLC SERPL-SCNC: 25 MG/DL

## 2024-09-19 PROCEDURE — 80061 LIPID PANEL: CPT

## 2024-09-19 PROCEDURE — 36415 COLL VENOUS BLD VENIPUNCTURE: CPT

## 2024-09-19 PROCEDURE — 85025 COMPLETE CBC W/AUTO DIFF WBC: CPT

## 2024-09-19 PROCEDURE — 84443 ASSAY THYROID STIM HORMONE: CPT

## 2024-09-19 PROCEDURE — 80053 COMPREHEN METABOLIC PANEL: CPT

## 2024-09-19 PROCEDURE — 83721 ASSAY OF BLOOD LIPOPROTEIN: CPT
